# Patient Record
Sex: MALE | Race: WHITE
[De-identification: names, ages, dates, MRNs, and addresses within clinical notes are randomized per-mention and may not be internally consistent; named-entity substitution may affect disease eponyms.]

---

## 2018-12-09 ENCOUNTER — HOSPITAL ENCOUNTER (EMERGENCY)
Dept: HOSPITAL 80 - FED | Age: 23
Discharge: HOME | End: 2018-12-09
Payer: MEDICAID

## 2018-12-09 VITALS — DIASTOLIC BLOOD PRESSURE: 70 MMHG | SYSTOLIC BLOOD PRESSURE: 121 MMHG

## 2018-12-09 DIAGNOSIS — E86.9: ICD-10-CM

## 2018-12-09 DIAGNOSIS — R19.7: Primary | ICD-10-CM

## 2018-12-09 DIAGNOSIS — R10.9: ICD-10-CM

## 2018-12-09 LAB — PLATELET # BLD: 276 10^3/UL (ref 150–400)

## 2018-12-09 NOTE — ASMTCMCOM
CM Note

 

CM Note                       

Notes:

Pt presented to the ED for N/V/D



Pt is homeless and new to the [facility]. Pt was recently homeless in [place] for about 4 

months. Pt states he moved to [place] from [place] "after I became homeless." Pt states he would like to 

return to [place] but hasn't been able to save up enough money. 



Pt provided various local homelessness resources including [facility] where he can go and complete Coordinated Entry today. Pt provided a Medicaid cab 

(CONF# [id number]) to the [facility]. 



Pt was also encouraged to follow-up with [facility]; info provided. Pt [facility] 

[facility] and encouraged him to reach out and enroll in the program. 



CM available for further assistance if needed. 

 

Date Signed:  12/09/2018 11:13 AM

Electronically Signed By:Deana Zarate RN